# Patient Record
Sex: FEMALE | Race: WHITE | URBAN - METROPOLITAN AREA
[De-identification: names, ages, dates, MRNs, and addresses within clinical notes are randomized per-mention and may not be internally consistent; named-entity substitution may affect disease eponyms.]

---

## 2017-05-02 ENCOUNTER — OUTPATIENT (OUTPATIENT)
Dept: OUTPATIENT SERVICES | Facility: HOSPITAL | Age: 72
LOS: 1 days | Discharge: HOME | End: 2017-05-02

## 2017-06-28 DIAGNOSIS — R10.9 UNSPECIFIED ABDOMINAL PAIN: ICD-10-CM

## 2017-12-14 ENCOUNTER — OUTPATIENT (OUTPATIENT)
Dept: OUTPATIENT SERVICES | Facility: HOSPITAL | Age: 72
LOS: 1 days | Discharge: HOME | End: 2017-12-14

## 2017-12-14 DIAGNOSIS — R10.13 EPIGASTRIC PAIN: ICD-10-CM

## 2017-12-14 DIAGNOSIS — Z12.31 ENCOUNTER FOR SCREENING MAMMOGRAM FOR MALIGNANT NEOPLASM OF BREAST: ICD-10-CM

## 2017-12-14 DIAGNOSIS — M25.562 PAIN IN LEFT KNEE: ICD-10-CM

## 2017-12-14 DIAGNOSIS — K92.2 GASTROINTESTINAL HEMORRHAGE, UNSPECIFIED: ICD-10-CM

## 2018-01-20 ENCOUNTER — OUTPATIENT (OUTPATIENT)
Dept: OUTPATIENT SERVICES | Facility: HOSPITAL | Age: 73
LOS: 1 days | Discharge: HOME | End: 2018-01-20

## 2018-01-20 DIAGNOSIS — R10.9 UNSPECIFIED ABDOMINAL PAIN: ICD-10-CM

## 2018-02-04 DIAGNOSIS — R10.13 EPIGASTRIC PAIN: ICD-10-CM

## 2018-02-04 DIAGNOSIS — K92.2 GASTROINTESTINAL HEMORRHAGE, UNSPECIFIED: ICD-10-CM

## 2018-02-05 ENCOUNTER — OUTPATIENT (OUTPATIENT)
Dept: OUTPATIENT SERVICES | Facility: HOSPITAL | Age: 73
LOS: 1 days | Discharge: HOME | End: 2018-02-05

## 2018-02-05 DIAGNOSIS — M54.9 DORSALGIA, UNSPECIFIED: ICD-10-CM

## 2018-02-22 ENCOUNTER — OUTPATIENT (OUTPATIENT)
Dept: OUTPATIENT SERVICES | Facility: HOSPITAL | Age: 73
LOS: 1 days | Discharge: HOME | End: 2018-02-22

## 2018-02-22 DIAGNOSIS — M54.2 CERVICALGIA: ICD-10-CM

## 2018-02-22 DIAGNOSIS — R06.02 SHORTNESS OF BREATH: ICD-10-CM

## 2018-02-22 DIAGNOSIS — R05 COUGH: ICD-10-CM

## 2018-12-06 ENCOUNTER — EMERGENCY (EMERGENCY)
Facility: HOSPITAL | Age: 73
LOS: 0 days | Discharge: HOME | End: 2018-12-07
Attending: EMERGENCY MEDICINE | Admitting: EMERGENCY MEDICINE

## 2018-12-06 VITALS
RESPIRATION RATE: 20 BRPM | TEMPERATURE: 98 F | OXYGEN SATURATION: 100 % | DIASTOLIC BLOOD PRESSURE: 76 MMHG | HEART RATE: 71 BPM | SYSTOLIC BLOOD PRESSURE: 161 MMHG

## 2018-12-06 DIAGNOSIS — R55 SYNCOPE AND COLLAPSE: ICD-10-CM

## 2018-12-06 DIAGNOSIS — I10 ESSENTIAL (PRIMARY) HYPERTENSION: ICD-10-CM

## 2018-12-06 DIAGNOSIS — I44.7 LEFT BUNDLE-BRANCH BLOCK, UNSPECIFIED: ICD-10-CM

## 2018-12-06 DIAGNOSIS — R07.9 CHEST PAIN, UNSPECIFIED: ICD-10-CM

## 2018-12-06 LAB
ALBUMIN SERPL ELPH-MCNC: 4.4 G/DL — SIGNIFICANT CHANGE UP (ref 3.5–5.2)
ALP SERPL-CCNC: 52 U/L — SIGNIFICANT CHANGE UP (ref 30–115)
ALT FLD-CCNC: 16 U/L — SIGNIFICANT CHANGE UP (ref 0–41)
ANION GAP SERPL CALC-SCNC: 15 MMOL/L — HIGH (ref 7–14)
APPEARANCE UR: CLEAR — SIGNIFICANT CHANGE UP
AST SERPL-CCNC: 24 U/L — SIGNIFICANT CHANGE UP (ref 0–41)
BILIRUB SERPL-MCNC: 0.6 MG/DL — SIGNIFICANT CHANGE UP (ref 0.2–1.2)
BILIRUB UR-MCNC: NEGATIVE — SIGNIFICANT CHANGE UP
BUN SERPL-MCNC: 16 MG/DL — SIGNIFICANT CHANGE UP (ref 10–20)
CALCIUM SERPL-MCNC: 10.2 MG/DL — HIGH (ref 8.5–10.1)
CHLORIDE SERPL-SCNC: 98 MMOL/L — SIGNIFICANT CHANGE UP (ref 98–110)
CO2 SERPL-SCNC: 27 MMOL/L — SIGNIFICANT CHANGE UP (ref 17–32)
COLOR SPEC: YELLOW — SIGNIFICANT CHANGE UP
CREAT SERPL-MCNC: 0.8 MG/DL — SIGNIFICANT CHANGE UP (ref 0.7–1.5)
DIFF PNL FLD: NEGATIVE — SIGNIFICANT CHANGE UP
GLUCOSE SERPL-MCNC: 102 MG/DL — HIGH (ref 70–99)
GLUCOSE UR QL: NEGATIVE MG/DL — SIGNIFICANT CHANGE UP
HCT VFR BLD CALC: 41.6 % — SIGNIFICANT CHANGE UP (ref 37–47)
HGB BLD-MCNC: 13.4 G/DL — SIGNIFICANT CHANGE UP (ref 12–16)
KETONES UR-MCNC: NEGATIVE — SIGNIFICANT CHANGE UP
LEUKOCYTE ESTERASE UR-ACNC: NEGATIVE — SIGNIFICANT CHANGE UP
LIDOCAIN IGE QN: 57 U/L — SIGNIFICANT CHANGE UP (ref 7–60)
MCHC RBC-ENTMCNC: 26.5 PG — LOW (ref 27–31)
MCHC RBC-ENTMCNC: 32.2 G/DL — SIGNIFICANT CHANGE UP (ref 32–37)
MCV RBC AUTO: 82.2 FL — SIGNIFICANT CHANGE UP (ref 81–99)
NITRITE UR-MCNC: NEGATIVE — SIGNIFICANT CHANGE UP
NRBC # BLD: 0 /100 WBCS — SIGNIFICANT CHANGE UP (ref 0–0)
PH UR: 7 — SIGNIFICANT CHANGE UP (ref 5–8)
PLATELET # BLD AUTO: 181 K/UL — SIGNIFICANT CHANGE UP (ref 130–400)
POTASSIUM SERPL-MCNC: 4.8 MMOL/L — SIGNIFICANT CHANGE UP (ref 3.5–5)
POTASSIUM SERPL-SCNC: 4.8 MMOL/L — SIGNIFICANT CHANGE UP (ref 3.5–5)
PROT SERPL-MCNC: 7.3 G/DL — SIGNIFICANT CHANGE UP (ref 6–8)
PROT UR-MCNC: NEGATIVE MG/DL — SIGNIFICANT CHANGE UP
RBC # BLD: 5.06 M/UL — SIGNIFICANT CHANGE UP (ref 4.2–5.4)
RBC # FLD: 13.7 % — SIGNIFICANT CHANGE UP (ref 11.5–14.5)
SODIUM SERPL-SCNC: 140 MMOL/L — SIGNIFICANT CHANGE UP (ref 135–146)
SP GR SPEC: 1.02 — SIGNIFICANT CHANGE UP (ref 1.01–1.03)
TROPONIN T SERPL-MCNC: <0.01 NG/ML — SIGNIFICANT CHANGE UP
TROPONIN T SERPL-MCNC: <0.01 NG/ML — SIGNIFICANT CHANGE UP
UROBILINOGEN FLD QL: 1 MG/DL (ref 0.2–0.2)
WBC # BLD: 7.46 K/UL — SIGNIFICANT CHANGE UP (ref 4.8–10.8)
WBC # FLD AUTO: 7.46 K/UL — SIGNIFICANT CHANGE UP (ref 4.8–10.8)

## 2018-12-06 RX ORDER — ONDANSETRON 8 MG/1
4 TABLET, FILM COATED ORAL ONCE
Qty: 0 | Refills: 0 | Status: COMPLETED | OUTPATIENT
Start: 2018-12-06 | End: 2018-12-06

## 2018-12-06 RX ORDER — ASPIRIN/CALCIUM CARB/MAGNESIUM 324 MG
325 TABLET ORAL ONCE
Qty: 0 | Refills: 0 | Status: COMPLETED | OUTPATIENT
Start: 2018-12-06 | End: 2018-12-06

## 2018-12-06 RX ORDER — SODIUM CHLORIDE 9 MG/ML
1000 INJECTION INTRAMUSCULAR; INTRAVENOUS; SUBCUTANEOUS ONCE
Qty: 0 | Refills: 0 | Status: COMPLETED | OUTPATIENT
Start: 2018-12-06 | End: 2018-12-06

## 2018-12-06 RX ORDER — ESCITALOPRAM OXALATE 10 MG/1
10 TABLET, FILM COATED ORAL AT BEDTIME
Qty: 0 | Refills: 0 | Status: DISCONTINUED | OUTPATIENT
Start: 2018-12-06 | End: 2018-12-07

## 2018-12-06 RX ORDER — CARBAMAZEPINE 200 MG
600 TABLET ORAL AT BEDTIME
Qty: 0 | Refills: 0 | Status: DISCONTINUED | OUTPATIENT
Start: 2018-12-06 | End: 2018-12-06

## 2018-12-06 RX ORDER — CARBAMAZEPINE 200 MG
800 TABLET ORAL
Qty: 0 | Refills: 0 | Status: DISCONTINUED | OUTPATIENT
Start: 2018-12-06 | End: 2018-12-06

## 2018-12-06 RX ORDER — DOCUSATE SODIUM 100 MG
100 CAPSULE ORAL AT BEDTIME
Qty: 0 | Refills: 0 | Status: DISCONTINUED | OUTPATIENT
Start: 2018-12-06 | End: 2018-12-07

## 2018-12-06 RX ADMIN — ONDANSETRON 4 MILLIGRAM(S): 8 TABLET, FILM COATED ORAL at 15:12

## 2018-12-06 RX ADMIN — SODIUM CHLORIDE 500 MILLILITER(S): 9 INJECTION INTRAMUSCULAR; INTRAVENOUS; SUBCUTANEOUS at 15:12

## 2018-12-06 RX ADMIN — SODIUM CHLORIDE 1000 MILLILITER(S): 9 INJECTION INTRAMUSCULAR; INTRAVENOUS; SUBCUTANEOUS at 15:42

## 2018-12-06 RX ADMIN — Medication 325 MILLIGRAM(S): at 16:28

## 2018-12-06 RX ADMIN — Medication 100 MILLIGRAM(S): at 21:22

## 2018-12-06 RX ADMIN — ESCITALOPRAM OXALATE 10 MILLIGRAM(S): 10 TABLET, FILM COATED ORAL at 21:22

## 2018-12-06 NOTE — ED ADULT NURSE REASSESSMENT NOTE - CONDITION
Patient ambulating with steady gait. No complaints of chest pain, shortness of breath or dizziness. Will continue to monitor. Continuous cardiac monitoring in progress. PM medication provided. Will continue to monitor. Patient is alert and oriented and able to make needs known./improved

## 2018-12-06 NOTE — ED PROVIDER NOTE - OBJECTIVE STATEMENT
74 yo F with hx of HTN, with known LBBB, presents for evaluation of near syncope, onset today, associated with flushing and chest discomfort. No fever, no chills, no headache, no nausea, no vomiting, no back pain, no SOB. Patient states she was at the doctors office when she started having chest discomfort and an episode where she felt "faint" but never fainted. Patient was sent to the ED for further evaluation. MARY Amador

## 2018-12-06 NOTE — ED PROVIDER NOTE - PROGRESS NOTE DETAILS
Patient still continues to feel the discomfort Discussed with patient labs and imaging. Discussed OBS. Patient agrees with plan

## 2018-12-06 NOTE — ED PROVIDER NOTE - MEDICAL DECISION MAKING DETAILS
pt with chest pressure and lightheadedness with near syncope.  NO SYNCOPE.  no abd pain, no back pain.  NO SOB.  no current chest pain or sob in the ED.  Trop negative.  pt given aspirin.  pt placed on observation for further cardiac work up.

## 2018-12-06 NOTE — ED PROVIDER NOTE - ATTENDING CONTRIBUTION TO CARE
74 yo f with pmh of htn presents with chest pressure associated with light headedness.  NO syncope.  no nauysea, no vomiting, no headache, no back pain, no abd pain, no urinary complaints, no dysuria.  no fevers, no chills.  awake, alert.  neck supple  abd soft, nontender.  LUngs clear.  MMM.  motor/sensation intact in all 4 ext.    a/p:  chest pain and lightheadedness.    p:  aspirin, ekg, cxr, supportive care, likely place on observation

## 2018-12-06 NOTE — ED CDU PROVIDER INITIAL DAY NOTE - OBJECTIVE STATEMENT
pt is a 72yo F w pmhx of IBS, HTN and LBBB comes to ed c/o pre- syncopal episode when she went to her GI doctor today. pt states she had a weird bleching sensation and didn feel well.  She went to walk to the  to tell he  and patient got shaky, lost control of her legs and she fell down. pt denies hitting her head or any injuries fell straight and was sitting on the floor. She thinks that maybe it could of been an anxiety attack. pT denies any visual changes, spinning of the room sensation,CP, SOB, N/V/D, H/A.

## 2018-12-06 NOTE — ED ADULT NURSE NOTE - CHPI ED NUR SYMPTOMS NEG
no tingling/no weakness/no fever/no chills/no pain/no vomiting/no nausea/no decreased eating/drinking

## 2018-12-06 NOTE — ED CDU PROVIDER INITIAL DAY NOTE - PROGRESS NOTE DETAILS
s/o note:  received s/o from RITA Chapman evaluated patient at bedside. no complaints. night meds ordered. will continue to observe. echo in am

## 2018-12-06 NOTE — ED ADULT NURSE NOTE - OBJECTIVE STATEMENT
Patient is a 73 year old complaining of dizziness and near syncope while at the GYN office today. Patient denies having a syncopal episode but states she felt like she was about to pass out. Patient denies any chest pain, shortness of breath, palpitations or abdominal pain

## 2018-12-06 NOTE — ED ADULT NURSE NOTE - NSIMPLEMENTINTERV_GEN_ALL_ED
Implemented All Universal Safety Interventions:  [place] to call system. Call bell, personal items and telephone within reach. Instruct patient to call for assistance. Room bathroom lighting operational. Non-slip footwear when patient is off stretcher. Physically safe environment: no spills, clutter or unnecessary equipment. Stretcher in lowest position, wheels locked, appropriate side rails in place.

## 2018-12-06 NOTE — ED CDU PROVIDER INITIAL DAY NOTE - ATTENDING CONTRIBUTION TO CARE
74 yo F with hx of HTN, with known LBBB, presents for evaluation of near syncope. Patient had mild chest discomfort during the event. Discussion had with cardiologist / Gustabo Adams who is very familiar with the patient. Patient has a history of GI bleed and Dr. Adams requested a repeat CBC, also requested a 2 d echo. Stated that he reviewed her CCTA from this year and if the echo is normal patient can go home. Patient does not want to wait for final results of echo. The patient wishes to leave against medical advice.  I have discussed the risks, benefits and alternatives (including the possibility of worsening of disease, pain, permanent disability, and/or death) with the patient and his/her family (if available).  The patient voices understanding of these risks, benefits, and alternatives and still wishes to sign out against medical advice.  The patient is awake, alert, oriented x 3 and has demonstrated capacity to refuse/direct care.  I have advised the patient that they can and should return immediately should they develop any worse/different/additional symptoms, or if they change their mind and want to continue their care. Patient has full capacity and has verbalized understanding.  Given detailed returned precautions.     CONSTITUTIONAL: Well-developed; well-nourished; in no acute distress. Sitting up and providing appropriate history and physical examination  SKIN: skin exam is warm and dry, no acute rash.  HEAD: Normocephalic; atraumatic.  EYES: PERRL, 3 mm bilateral, no nystagmus, EOM intact; conjunctiva and sclera clear.  ENT: No nasal discharge; airway clear.  NECK: Supple; non tender. + full passive ROM in all directions. No JVD  CARD: S1, S2 normal; no murmurs, gallops, or rubs. Regular rate and rhythm. + Symmetric Strong Pulses  RESP: No wheezes, rales or rhonchi. Good air movement bilaterally  ABD: soft; non-distended; non-tender. No Rebound, No Guarding, No signs of peritonitis, No CVA tenderness. No pulsatile abdominal mass. + Strong and Symmetric Pulses  EXT: Normal ROM. No clubbing, cyanosis or edema. Dp and Pt Pulses intact. Cap refill less than 3 seconds  NEURO: CN 2-12 intact, normal finger to nose, normal romberg, stable gait, no sensory or motor deficits, Alert, oriented, grossly unremarkable. No Focal deficits. GCS 15. NIH 0  PSYCH: Cooperative, appropriate.

## 2018-12-07 VITALS — DIASTOLIC BLOOD PRESSURE: 92 MMHG | HEART RATE: 91 BPM | SYSTOLIC BLOOD PRESSURE: 166 MMHG

## 2018-12-07 LAB
HCT VFR BLD CALC: 41.1 % — SIGNIFICANT CHANGE UP (ref 37–47)
HGB BLD-MCNC: 12.9 G/DL — SIGNIFICANT CHANGE UP (ref 12–16)
MCHC RBC-ENTMCNC: 25.7 PG — LOW (ref 27–31)
MCHC RBC-ENTMCNC: 31.4 G/DL — LOW (ref 32–37)
MCV RBC AUTO: 82 FL — SIGNIFICANT CHANGE UP (ref 81–99)
NRBC # BLD: 0 /100 WBCS — SIGNIFICANT CHANGE UP (ref 0–0)
PLATELET # BLD AUTO: 178 K/UL — SIGNIFICANT CHANGE UP (ref 130–400)
RBC # BLD: 5.01 M/UL — SIGNIFICANT CHANGE UP (ref 4.2–5.4)
RBC # FLD: 13.8 % — SIGNIFICANT CHANGE UP (ref 11.5–14.5)
WBC # BLD: 5.09 K/UL — SIGNIFICANT CHANGE UP (ref 4.8–10.8)
WBC # FLD AUTO: 5.09 K/UL — SIGNIFICANT CHANGE UP (ref 4.8–10.8)

## 2018-12-07 RX ORDER — DILTIAZEM HCL 120 MG
180 CAPSULE, EXT RELEASE 24 HR ORAL DAILY
Qty: 0 | Refills: 0 | Status: DISCONTINUED | OUTPATIENT
Start: 2018-12-07 | End: 2018-12-07

## 2018-12-07 RX ORDER — HYDROCHLOROTHIAZIDE 25 MG
12.5 TABLET ORAL ONCE
Qty: 0 | Refills: 0 | Status: COMPLETED | OUTPATIENT
Start: 2018-12-07 | End: 2018-12-07

## 2018-12-07 RX ORDER — LOSARTAN POTASSIUM 100 MG/1
100 TABLET, FILM COATED ORAL DAILY
Qty: 0 | Refills: 0 | Status: DISCONTINUED | OUTPATIENT
Start: 2018-12-07 | End: 2018-12-07

## 2018-12-07 RX ADMIN — Medication 12.5 MILLIGRAM(S): at 15:23

## 2018-12-07 RX ADMIN — Medication 180 MILLIGRAM(S): at 15:24

## 2018-12-07 RX ADMIN — LOSARTAN POTASSIUM 100 MILLIGRAM(S): 100 TABLET, FILM COATED ORAL at 15:24

## 2018-12-07 NOTE — ED ADULT NURSE REASSESSMENT NOTE - NS ED NURSE REASSESS COMMENT FT1
Received patient under my care patient is alert and oriented X 4 patient denies CP or SOB no C/o of dizziness patient on continous cardiac monitor.  Awaiting echo and disposition.
patient assessed, pt complained of dizziness and nausea, Zofran given for nausea with IVF as ordered, labs sent pending results, cardiac monitoring maintained, vital signs stable, will continue to monitor,.
Patient leaving AMA no cardiolologist to read ECHO. Patient agrees with plan to be called tomorrow with results

## 2018-12-07 NOTE — ED CDU PROVIDER SUBSEQUENT DAY NOTE - ATTENDING CONTRIBUTION TO CARE
72 yo F with hx of HTN, with known LBBB, presents for evaluation of near syncope. Patient had mild chest discomfort during the event. Discussion had with cardiologist / Gustabo Adams who is very familiar with the patient. Patient has a history of GI bleed and Dr. Adams requested a repeat CBC, also requested a 2 d echo. Stated that he reviewed her CCTA from this year and if the echo is normal patient can go home. Patient does not want to wait for final results of echo. The patient wishes to leave against medical advice.  I have discussed the risks, benefits and alternatives (including the possibility of worsening of disease, pain, permanent disability, and/or death) with the patient and his/her family (if available).  The patient voices understanding of these risks, benefits, and alternatives and still wishes to sign out against medical advice.  The patient is awake, alert, oriented x 3 and has demonstrated capacity to refuse/direct care.  I have advised the patient that they can and should return immediately should they develop any worse/different/additional symptoms, or if they change their mind and want to continue their care. Patient has full capacity and has verbalized understanding.  Given detailed returned precautions.     CONSTITUTIONAL: Well-developed; well-nourished; in no acute distress. Sitting up and providing appropriate history and physical examination  SKIN: skin exam is warm and dry, no acute rash.  HEAD: Normocephalic; atraumatic.  EYES: PERRL, 3 mm bilateral, no nystagmus, EOM intact; conjunctiva and sclera clear.  ENT: No nasal discharge; airway clear.  NECK: Supple; non tender. + full passive ROM in all directions. No JVD  CARD: S1, S2 normal; no murmurs, gallops, or rubs. Regular rate and rhythm. + Symmetric Strong Pulses  RESP: No wheezes, rales or rhonchi. Good air movement bilaterally  ABD: soft; non-distended; non-tender. No Rebound, No Guarding, No signs of peritonitis, No CVA tenderness. No pulsatile abdominal mass. + Strong and Symmetric Pulses  EXT: Normal ROM. No clubbing, cyanosis or edema. Dp and Pt Pulses intact. Cap refill less than 3 seconds  NEURO: CN 2-12 intact, normal finger to nose, normal romberg, stable gait, no sensory or motor deficits, Alert, oriented, grossly unremarkable. No Focal deficits. GCS 15. NIH 0  PSYCH: Cooperative, appropriate.

## 2018-12-07 NOTE — ED CDU PROVIDER SUBSEQUENT DAY NOTE - HISTORY
t is a 72yo F w pmhx of IBS, HTN and LBBB comes to ed c/o pre- syncopal episode when she went to her GI doctor yesterday. Pt seen bedside NAD, going for echo.

## 2018-12-07 NOTE — ED CDU PROVIDER DISPOSITION NOTE - CLINICAL COURSE
72 yo F with hx of HTN, with known LBBB, presents for evaluation of near syncope. Patient had mild chest discomfort during the event. Discussion had with cardiologist / Gustabo Adams who is very familiar with the patient. Patient has a history of GI bleed and Dr. Adams requested a repeat CBC, also requested a 2 d echo. Stated that he reviewed her CCTA from this year and if the echo is normal patient can go home. Patient does not want to wait for final results of echo. The patient wishes to leave against medical advice.  I have discussed the risks, benefits and alternatives (including the possibility of worsening of disease, pain, permanent disability, and/or death) with the patient and his/her family (if available).  The patient voices understanding of these risks, benefits, and alternatives and still wishes to sign out against medical advice.  The patient is awake, alert, oriented x 3 and has demonstrated capacity to refuse/direct care.  I have advised the patient that they can and should return immediately should they develop any worse/different/additional symptoms, or if they change their mind and want to continue their care. Patient has full capacity and has verbalized understanding.  Given detailed returned precautions.

## 2018-12-07 NOTE — ED CDU PROVIDER SUBSEQUENT DAY NOTE - PROGRESS NOTE DETAILS
Dr. Morgan spoke with Dr. Ma pts cadiologist, plan is to repeat CBC and get echo results if normal; pt can be discharged and follow up outpatient with Dr. Reveles.

## 2018-12-15 ENCOUNTER — OUTPATIENT (OUTPATIENT)
Dept: OUTPATIENT SERVICES | Facility: HOSPITAL | Age: 73
LOS: 1 days | Discharge: HOME | End: 2018-12-15

## 2018-12-15 DIAGNOSIS — Z12.31 ENCOUNTER FOR SCREENING MAMMOGRAM FOR MALIGNANT NEOPLASM OF BREAST: ICD-10-CM

## 2018-12-15 PROBLEM — F41.9 ANXIETY DISORDER, UNSPECIFIED: Chronic | Status: ACTIVE | Noted: 2018-12-06

## 2018-12-15 PROBLEM — I10 ESSENTIAL (PRIMARY) HYPERTENSION: Chronic | Status: ACTIVE | Noted: 2018-12-06

## 2018-12-15 PROBLEM — K21.9 GASTRO-ESOPHAGEAL REFLUX DISEASE WITHOUT ESOPHAGITIS: Chronic | Status: ACTIVE | Noted: 2018-12-06

## 2019-12-17 ENCOUNTER — OUTPATIENT (OUTPATIENT)
Dept: OUTPATIENT SERVICES | Facility: HOSPITAL | Age: 74
LOS: 1 days | Discharge: HOME | End: 2019-12-17
Payer: MEDICARE

## 2019-12-17 DIAGNOSIS — Z12.31 ENCOUNTER FOR SCREENING MAMMOGRAM FOR MALIGNANT NEOPLASM OF BREAST: ICD-10-CM

## 2019-12-17 PROCEDURE — 77063 BREAST TOMOSYNTHESIS BI: CPT | Mod: 26

## 2019-12-17 PROCEDURE — 77067 SCR MAMMO BI INCL CAD: CPT | Mod: 26

## 2019-12-19 DIAGNOSIS — M81.0 AGE-RELATED OSTEOPOROSIS WITHOUT CURRENT PATHOLOGICAL FRACTURE: ICD-10-CM

## 2019-12-19 DIAGNOSIS — Z78.0 ASYMPTOMATIC MENOPAUSAL STATE: ICD-10-CM

## 2019-12-19 DIAGNOSIS — Z09 ENCOUNTER FOR FOLLOW-UP EXAMINATION AFTER COMPLETED TREATMENT FOR CONDITIONS OTHER THAN MALIGNANT NEOPLASM: ICD-10-CM

## 2020-12-18 ENCOUNTER — OUTPATIENT (OUTPATIENT)
Dept: OUTPATIENT SERVICES | Facility: HOSPITAL | Age: 75
LOS: 1 days | Discharge: HOME | End: 2020-12-18
Payer: MEDICARE

## 2020-12-18 DIAGNOSIS — Z12.31 ENCOUNTER FOR SCREENING MAMMOGRAM FOR MALIGNANT NEOPLASM OF BREAST: ICD-10-CM

## 2020-12-18 PROCEDURE — 77067 SCR MAMMO BI INCL CAD: CPT | Mod: 26

## 2020-12-18 PROCEDURE — 77063 BREAST TOMOSYNTHESIS BI: CPT | Mod: 26

## 2021-02-07 ENCOUNTER — OUTPATIENT (OUTPATIENT)
Dept: OUTPATIENT SERVICES | Facility: HOSPITAL | Age: 76
LOS: 1 days | Discharge: HOME | End: 2021-02-07

## 2021-02-07 DIAGNOSIS — Z11.59 ENCOUNTER FOR SCREENING FOR OTHER VIRAL DISEASES: ICD-10-CM

## 2021-02-10 ENCOUNTER — OUTPATIENT (OUTPATIENT)
Dept: OUTPATIENT SERVICES | Facility: HOSPITAL | Age: 76
LOS: 1 days | Discharge: HOME | End: 2021-02-10

## 2021-02-10 VITALS
RESPIRATION RATE: 16 BRPM | HEIGHT: 62 IN | DIASTOLIC BLOOD PRESSURE: 96 MMHG | WEIGHT: 160.06 LBS | HEART RATE: 112 BPM | OXYGEN SATURATION: 99 % | TEMPERATURE: 99 F | SYSTOLIC BLOOD PRESSURE: 147 MMHG

## 2021-02-10 VITALS — RESPIRATION RATE: 17 BRPM | DIASTOLIC BLOOD PRESSURE: 73 MMHG | SYSTOLIC BLOOD PRESSURE: 145 MMHG | HEART RATE: 87 BPM

## 2021-02-10 DIAGNOSIS — Z98.891 HISTORY OF UTERINE SCAR FROM PREVIOUS SURGERY: Chronic | ICD-10-CM

## 2021-02-10 DIAGNOSIS — Z98.890 OTHER SPECIFIED POSTPROCEDURAL STATES: Chronic | ICD-10-CM

## 2021-02-10 NOTE — CHART NOTE - NSCHARTNOTEFT_GEN_A_CORE
PACU ANESTHESIA ADMISSION NOTE      Procedure: Cataract extraction with IOL implant OS   Post op DX  Cataract left eye  ____  Intubated  TV:______       Rate: ______      FiO2: ______    __x__  Patent Airway    _x___  Full return of protective reflexes    __x__  Full recovery from anesthesia / back to baseline status    Vitals:  T(C): 37.2 (02-10-21 @ 12:30), Max: 37.2 (02-10-21 @ 11:20)  HR: 112 (02-10-21 @ 12:30) (112 - 112)  BP: 147/96 (02-10-21 @ 12:30) (147/96 - 147/96)  RR: 16 (02-10-21 @ 12:30) (16 - 16)  SpO2: 99% (02-10-21 @ 12:30) (99% - 99%)    Mental Status:  __x__ Awake   _____ Alert   _____ Drowsy   _____ Sedated    Nausea/Vomiting:  ____ NO  __x___Yes,   See Post - Op Orders          Pain Scale (0-10):  _____    Treatment: ____ None    _x  Post - Operative Fluids:   ____ Oral   _x___ See Post - Op Orders    Plan: Discharge:   __x__Home       _____Floor     _____Critical Care    _____  Other:_________________    Comments: uneventful anesthesia course no complications. VItals stable. Pt transferred to PACU

## 2021-02-16 DIAGNOSIS — I10 ESSENTIAL (PRIMARY) HYPERTENSION: ICD-10-CM

## 2021-02-16 DIAGNOSIS — H25.12 AGE-RELATED NUCLEAR CATARACT, LEFT EYE: ICD-10-CM

## 2021-02-16 DIAGNOSIS — F32.9 MAJOR DEPRESSIVE DISORDER, SINGLE EPISODE, UNSPECIFIED: ICD-10-CM

## 2021-02-16 DIAGNOSIS — K21.9 GASTRO-ESOPHAGEAL REFLUX DISEASE WITHOUT ESOPHAGITIS: ICD-10-CM

## 2021-02-16 DIAGNOSIS — Z88.0 ALLERGY STATUS TO PENICILLIN: ICD-10-CM

## 2021-02-16 DIAGNOSIS — K58.9 IRRITABLE BOWEL SYNDROME WITHOUT DIARRHEA: ICD-10-CM

## 2021-02-16 DIAGNOSIS — H57.04 MYDRIASIS: ICD-10-CM

## 2021-02-16 DIAGNOSIS — K64.9 UNSPECIFIED HEMORRHOIDS: ICD-10-CM

## 2021-02-16 DIAGNOSIS — J30.2 OTHER SEASONAL ALLERGIC RHINITIS: ICD-10-CM

## 2021-03-30 ENCOUNTER — TRANSCRIPTION ENCOUNTER (OUTPATIENT)
Age: 76
End: 2021-03-30

## 2021-05-28 ENCOUNTER — TRANSCRIPTION ENCOUNTER (OUTPATIENT)
Age: 76
End: 2021-05-28

## 2021-12-22 ENCOUNTER — OUTPATIENT (OUTPATIENT)
Dept: OUTPATIENT SERVICES | Facility: HOSPITAL | Age: 76
LOS: 1 days | Discharge: HOME | End: 2021-12-22
Payer: MEDICARE

## 2021-12-22 DIAGNOSIS — Z98.890 OTHER SPECIFIED POSTPROCEDURAL STATES: Chronic | ICD-10-CM

## 2021-12-22 DIAGNOSIS — Z12.31 ENCOUNTER FOR SCREENING MAMMOGRAM FOR MALIGNANT NEOPLASM OF BREAST: ICD-10-CM

## 2021-12-22 DIAGNOSIS — Z98.891 HISTORY OF UTERINE SCAR FROM PREVIOUS SURGERY: Chronic | ICD-10-CM

## 2021-12-22 PROBLEM — K64.9 UNSPECIFIED HEMORRHOIDS: Chronic | Status: ACTIVE | Noted: 2021-02-10

## 2021-12-22 PROBLEM — K58.9 IRRITABLE BOWEL SYNDROME WITHOUT DIARRHEA: Chronic | Status: ACTIVE | Noted: 2021-02-10

## 2021-12-22 PROBLEM — J30.2 OTHER SEASONAL ALLERGIC RHINITIS: Chronic | Status: ACTIVE | Noted: 2021-02-10

## 2021-12-22 PROBLEM — K58.9 IRRITABLE BOWEL SYNDROME, UNSPECIFIED: Chronic | Status: ACTIVE | Noted: 2021-02-10

## 2021-12-22 PROCEDURE — 77063 BREAST TOMOSYNTHESIS BI: CPT | Mod: 26

## 2021-12-22 PROCEDURE — 77067 SCR MAMMO BI INCL CAD: CPT | Mod: 26

## 2021-12-23 DIAGNOSIS — Z09 ENCOUNTER FOR FOLLOW-UP EXAMINATION AFTER COMPLETED TREATMENT FOR CONDITIONS OTHER THAN MALIGNANT NEOPLASM: ICD-10-CM

## 2021-12-23 DIAGNOSIS — M81.0 AGE-RELATED OSTEOPOROSIS WITHOUT CURRENT PATHOLOGICAL FRACTURE: ICD-10-CM

## 2021-12-23 DIAGNOSIS — Z82.62 FAMILY HISTORY OF OSTEOPOROSIS: ICD-10-CM

## 2021-12-23 DIAGNOSIS — Z78.0 ASYMPTOMATIC MENOPAUSAL STATE: ICD-10-CM

## 2022-02-24 ENCOUNTER — OUTPATIENT (OUTPATIENT)
Dept: OUTPATIENT SERVICES | Facility: HOSPITAL | Age: 77
LOS: 1 days | Discharge: HOME | End: 2022-02-24
Payer: MEDICARE

## 2022-02-24 DIAGNOSIS — Z98.891 HISTORY OF UTERINE SCAR FROM PREVIOUS SURGERY: Chronic | ICD-10-CM

## 2022-02-24 DIAGNOSIS — J20.9 ACUTE BRONCHITIS, UNSPECIFIED: ICD-10-CM

## 2022-02-24 DIAGNOSIS — Z98.890 OTHER SPECIFIED POSTPROCEDURAL STATES: Chronic | ICD-10-CM

## 2022-02-24 PROCEDURE — 71046 X-RAY EXAM CHEST 2 VIEWS: CPT | Mod: 26

## 2022-03-18 ENCOUNTER — OUTPATIENT (OUTPATIENT)
Dept: OUTPATIENT SERVICES | Facility: HOSPITAL | Age: 77
LOS: 1 days | Discharge: HOME | End: 2022-03-18
Payer: MEDICARE

## 2022-03-18 DIAGNOSIS — I31.3 PERICARDIAL EFFUSION (NONINFLAMMATORY): ICD-10-CM

## 2022-03-18 DIAGNOSIS — Z98.890 OTHER SPECIFIED POSTPROCEDURAL STATES: Chronic | ICD-10-CM

## 2022-03-18 DIAGNOSIS — Z98.891 HISTORY OF UTERINE SCAR FROM PREVIOUS SURGERY: Chronic | ICD-10-CM

## 2022-03-18 PROCEDURE — 71250 CT THORAX DX C-: CPT | Mod: 26,MH

## 2022-03-18 PROCEDURE — 72110 X-RAY EXAM L-2 SPINE 4/>VWS: CPT | Mod: 26

## 2022-03-23 PROBLEM — Z00.00 ENCOUNTER FOR PREVENTIVE HEALTH EXAMINATION: Status: ACTIVE | Noted: 2022-03-23

## 2022-03-29 ENCOUNTER — OUTPATIENT (OUTPATIENT)
Dept: OUTPATIENT SERVICES | Facility: HOSPITAL | Age: 77
LOS: 1 days | Discharge: HOME | End: 2022-03-29
Payer: MEDICARE

## 2022-03-29 DIAGNOSIS — Z98.891 HISTORY OF UTERINE SCAR FROM PREVIOUS SURGERY: Chronic | ICD-10-CM

## 2022-03-29 DIAGNOSIS — M54.9 DORSALGIA, UNSPECIFIED: ICD-10-CM

## 2022-03-29 DIAGNOSIS — Z98.890 OTHER SPECIFIED POSTPROCEDURAL STATES: Chronic | ICD-10-CM

## 2022-03-29 PROCEDURE — 72148 MRI LUMBAR SPINE W/O DYE: CPT | Mod: 26,MH

## 2022-04-12 ENCOUNTER — APPOINTMENT (OUTPATIENT)
Dept: NEUROSURGERY | Facility: CLINIC | Age: 77
End: 2022-04-12
Payer: MEDICARE

## 2022-04-12 VITALS — BODY MASS INDEX: 28.52 KG/M2 | HEIGHT: 62 IN | WEIGHT: 155 LBS

## 2022-04-12 DIAGNOSIS — Z87.09 PERSONAL HISTORY OF OTHER DISEASES OF THE RESPIRATORY SYSTEM: ICD-10-CM

## 2022-04-12 DIAGNOSIS — Z87.19 PERSONAL HISTORY OF OTHER DISEASES OF THE DIGESTIVE SYSTEM: ICD-10-CM

## 2022-04-12 DIAGNOSIS — Z80.9 FAMILY HISTORY OF MALIGNANT NEOPLASM, UNSPECIFIED: ICD-10-CM

## 2022-04-12 DIAGNOSIS — S32.010A WEDGE COMPRESSION FRACTURE OF FIRST LUMBAR VERTEBRA, INITIAL ENCOUNTER FOR CLOSED FRACTURE: ICD-10-CM

## 2022-04-12 DIAGNOSIS — Z82.49 FAMILY HISTORY OF ISCHEMIC HEART DISEASE AND OTHER DISEASES OF THE CIRCULATORY SYSTEM: ICD-10-CM

## 2022-04-12 DIAGNOSIS — Z86.79 PERSONAL HISTORY OF OTHER DISEASES OF THE CIRCULATORY SYSTEM: ICD-10-CM

## 2022-04-12 DIAGNOSIS — Z78.9 OTHER SPECIFIED HEALTH STATUS: ICD-10-CM

## 2022-04-12 DIAGNOSIS — Z86.59 PERSONAL HISTORY OF OTHER MENTAL AND BEHAVIORAL DISORDERS: ICD-10-CM

## 2022-04-12 DIAGNOSIS — Z87.39 PERSONAL HISTORY OF OTHER DISEASES OF THE MUSCULOSKELETAL SYSTEM AND CONNECTIVE TISSUE: ICD-10-CM

## 2022-04-12 PROCEDURE — 99204 OFFICE O/P NEW MOD 45 MIN: CPT

## 2022-04-12 RX ORDER — LOSARTAN POTASSIUM AND HYDROCHLOROTHIAZIDE 12.5; 1 MG/1; MG/1
100-12.5 TABLET ORAL
Refills: 0 | Status: ACTIVE | COMMUNITY

## 2022-04-12 RX ORDER — DILTIAZEM HYDROCHLORIDE 180 MG/1
180 CAPSULE, EXTENDED RELEASE ORAL
Refills: 0 | Status: ACTIVE | COMMUNITY

## 2022-04-12 RX ORDER — LORATADINE 10 MG/1
10 TABLET ORAL
Refills: 0 | Status: ACTIVE | COMMUNITY

## 2022-04-12 RX ORDER — MONTELUKAST SODIUM 10 MG/1
10 TABLET, FILM COATED ORAL
Refills: 0 | Status: ACTIVE | COMMUNITY

## 2022-04-12 RX ORDER — LORAZEPAM 0.5 MG/1
0.5 TABLET ORAL
Refills: 0 | Status: ACTIVE | COMMUNITY

## 2022-04-12 RX ORDER — PANTOPRAZOLE 40 MG/1
40 TABLET, DELAYED RELEASE ORAL
Refills: 0 | Status: ACTIVE | COMMUNITY

## 2022-04-13 NOTE — HISTORY OF PRESENT ILLNESS
[de-identified] : Ms. BALL had a fall in February 2022.  She tripped and fell.  After the fall she was feeling well without pain.  Unfortunately 3 weeks ago she was getting up from the couch when she developed acute back pain.  It was quite severe.  She saw her primary care physician.  X-rays were ordered followed by an MRI of the lumbar spine. I have reviewed the studies today.  She is found to have an acute/subacute L1 compression fracture.  Recent DEXA scan from December 2021 confirmed osteoporosis.  She is on Prolia.  No recent physical therapy.  Today she does report that her back pain has been improving.  Although she has continued pain she is much more comfortable.  She denies radiculopathy.\par \par PHYSICAL EXAM: \par \par Constitutional: Well appearing, no distress\par HEENT: Normocephalic Atraumatic\par Psychiatric: Alert and oriented to all spheres, normal mood\par Pulmonary: No respiratory distress\par \par Neurologic: \par CN II-XII grossly intact\par Palpation: Mild lumbar tenderness.\par Strength: Full strength in all major muscle groups, no atrophy\par Sensation: Full sensation to light touch in all extremities\par Reflexes: \par  2+ patellar\par  2+ biceps\par  2+ ankle jerk\par  No Stapleton's\par  No clonus\par  No babinski\par \par ROM: Mild restriction range of motion lumbar spine.\par \par Signs:\par SLR negative\par L'hermitte's negative\par \par Gait: steady, walking w/o assistance. \par \par \par \par

## 2022-04-13 NOTE — ASSESSMENT
[FreeTextEntry1] : Ms. BALL is is found to have an acute/subacute L1 compression fracture.  Her back pain has been improving.  I have recommended outpatient physical therapy.  If she continues to improve then she may follow-up as needed.

## 2022-04-13 NOTE — END OF VISIT
[FreeTextEntry3] : ATTENDING ADDENDUM\par Patient seen and examined. Clinical history, imaging, and electronic medical record reviewed independently by me. Assessment and plan per ACP note above.  Ms. Mora presents to evaluate her low back pain secondary to L1 osteoporotic compression fracture; she is improved with expectant management.  We will enroll her into physical therapy and continue to follow her and manage conservatively.  If she fails this she is a candidate for an L1 kyphoplasty procedure.  She expressed agreement and will follow up in several months.\par \par Neurologic Exam/Physical Exam\par Alert & Oriented x 3\par CN2-12 grossly intact\par Sensation intact to light touch, pain/temperature intact\par Vibration/proprioception Intact\par Motor strength 5/5 in all extremities\par 2+ patellar, 2+ brachioradialis \par  [Time Spent: ___ minutes] : I have spent [unfilled] minutes of time on the encounter.

## 2022-06-16 ENCOUNTER — OUTPATIENT (OUTPATIENT)
Dept: OUTPATIENT SERVICES | Facility: HOSPITAL | Age: 77
LOS: 1 days | Discharge: HOME | End: 2022-06-16
Payer: MEDICARE

## 2022-06-16 DIAGNOSIS — Z98.891 HISTORY OF UTERINE SCAR FROM PREVIOUS SURGERY: Chronic | ICD-10-CM

## 2022-06-16 DIAGNOSIS — Z98.890 OTHER SPECIFIED POSTPROCEDURAL STATES: Chronic | ICD-10-CM

## 2022-06-16 DIAGNOSIS — R10.9 UNSPECIFIED ABDOMINAL PAIN: ICD-10-CM

## 2022-06-16 PROCEDURE — 76700 US EXAM ABDOM COMPLETE: CPT | Mod: 26

## 2022-09-27 ENCOUNTER — OUTPATIENT (OUTPATIENT)
Dept: OUTPATIENT SERVICES | Facility: HOSPITAL | Age: 77
LOS: 1 days | Discharge: HOME | End: 2022-09-27

## 2022-09-27 DIAGNOSIS — R07.9 CHEST PAIN, UNSPECIFIED: ICD-10-CM

## 2022-09-27 DIAGNOSIS — Z98.890 OTHER SPECIFIED POSTPROCEDURAL STATES: Chronic | ICD-10-CM

## 2022-09-27 DIAGNOSIS — Z98.891 HISTORY OF UTERINE SCAR FROM PREVIOUS SURGERY: Chronic | ICD-10-CM

## 2022-09-27 PROCEDURE — 75574 CT ANGIO HRT W/3D IMAGE: CPT | Mod: 26,MH

## 2022-09-28 ENCOUNTER — OUTPATIENT (OUTPATIENT)
Dept: OUTPATIENT SERVICES | Facility: HOSPITAL | Age: 77
LOS: 1 days | Discharge: HOME | End: 2022-09-28

## 2022-09-28 DIAGNOSIS — Z98.890 OTHER SPECIFIED POSTPROCEDURAL STATES: Chronic | ICD-10-CM

## 2022-09-28 DIAGNOSIS — R07.9 CHEST PAIN, UNSPECIFIED: ICD-10-CM

## 2022-09-28 DIAGNOSIS — Z98.891 HISTORY OF UTERINE SCAR FROM PREVIOUS SURGERY: Chronic | ICD-10-CM

## 2022-09-28 PROCEDURE — 0504T: CPT

## 2022-10-06 NOTE — ED ADULT NURSE NOTE - GENITOURINARY WDL
You can access the FollowMyHealth Patient Portal offered by [facility] by registering at the following website: [email or web address]. By joining [employer]’s FollowMyHealth portal, you will also be able to view your health information using other applications (apps) compatible with our system.
Bladder non-tender and non-distended. Urine clear yellow.

## 2022-10-26 ENCOUNTER — OUTPATIENT (OUTPATIENT)
Dept: OUTPATIENT SERVICES | Facility: HOSPITAL | Age: 77
LOS: 1 days | Discharge: HOME | End: 2022-10-26

## 2022-10-26 VITALS
TEMPERATURE: 97 F | SYSTOLIC BLOOD PRESSURE: 159 MMHG | OXYGEN SATURATION: 97 % | DIASTOLIC BLOOD PRESSURE: 80 MMHG | HEART RATE: 86 BPM | RESPIRATION RATE: 16 BRPM

## 2022-10-26 DIAGNOSIS — Z98.891 HISTORY OF UTERINE SCAR FROM PREVIOUS SURGERY: Chronic | ICD-10-CM

## 2022-10-26 DIAGNOSIS — Z98.890 OTHER SPECIFIED POSTPROCEDURAL STATES: Chronic | ICD-10-CM

## 2022-10-26 LAB
ANION GAP SERPL CALC-SCNC: 13 MMOL/L — SIGNIFICANT CHANGE UP (ref 7–14)
BUN SERPL-MCNC: 16 MG/DL — SIGNIFICANT CHANGE UP (ref 10–20)
CALCIUM SERPL-MCNC: 10.9 MG/DL — HIGH (ref 8.4–10.5)
CHLORIDE SERPL-SCNC: 101 MMOL/L — SIGNIFICANT CHANGE UP (ref 98–110)
CO2 SERPL-SCNC: 28 MMOL/L — SIGNIFICANT CHANGE UP (ref 17–32)
CREAT SERPL-MCNC: 0.8 MG/DL — SIGNIFICANT CHANGE UP (ref 0.7–1.5)
EGFR: 76 ML/MIN/1.73M2 — SIGNIFICANT CHANGE UP
GLUCOSE SERPL-MCNC: 130 MG/DL — HIGH (ref 70–99)
HCT VFR BLD CALC: 37 % — SIGNIFICANT CHANGE UP (ref 37–47)
HCT VFR BLD CALC: 44.4 % — SIGNIFICANT CHANGE UP (ref 37–47)
HGB BLD-MCNC: 12.3 G/DL — SIGNIFICANT CHANGE UP (ref 12–16)
HGB BLD-MCNC: 14.2 G/DL — SIGNIFICANT CHANGE UP (ref 12–16)
MCHC RBC-ENTMCNC: 27.3 PG — SIGNIFICANT CHANGE UP (ref 27–31)
MCHC RBC-ENTMCNC: 27.7 PG — SIGNIFICANT CHANGE UP (ref 27–31)
MCHC RBC-ENTMCNC: 32 G/DL — SIGNIFICANT CHANGE UP (ref 32–37)
MCHC RBC-ENTMCNC: 33.2 G/DL — SIGNIFICANT CHANGE UP (ref 32–37)
MCV RBC AUTO: 83.3 FL — SIGNIFICANT CHANGE UP (ref 81–99)
MCV RBC AUTO: 85.2 FL — SIGNIFICANT CHANGE UP (ref 81–99)
NRBC # BLD: 0 /100 WBCS — SIGNIFICANT CHANGE UP (ref 0–0)
NRBC # BLD: 0 /100 WBCS — SIGNIFICANT CHANGE UP (ref 0–0)
PLATELET # BLD AUTO: 161 K/UL — SIGNIFICANT CHANGE UP (ref 130–400)
PLATELET # BLD AUTO: 192 K/UL — SIGNIFICANT CHANGE UP (ref 130–400)
POTASSIUM SERPL-MCNC: 4.3 MMOL/L — SIGNIFICANT CHANGE UP (ref 3.5–5)
POTASSIUM SERPL-SCNC: 4.3 MMOL/L — SIGNIFICANT CHANGE UP (ref 3.5–5)
RBC # BLD: 4.44 M/UL — SIGNIFICANT CHANGE UP (ref 4.2–5.4)
RBC # BLD: 5.21 M/UL — SIGNIFICANT CHANGE UP (ref 4.2–5.4)
RBC # FLD: 13.5 % — SIGNIFICANT CHANGE UP (ref 11.5–14.5)
RBC # FLD: 13.5 % — SIGNIFICANT CHANGE UP (ref 11.5–14.5)
SODIUM SERPL-SCNC: 142 MMOL/L — SIGNIFICANT CHANGE UP (ref 135–146)
WBC # BLD: 5.79 K/UL — SIGNIFICANT CHANGE UP (ref 4.8–10.8)
WBC # BLD: 6.11 K/UL — SIGNIFICANT CHANGE UP (ref 4.8–10.8)
WBC # FLD AUTO: 5.79 K/UL — SIGNIFICANT CHANGE UP (ref 4.8–10.8)
WBC # FLD AUTO: 6.11 K/UL — SIGNIFICANT CHANGE UP (ref 4.8–10.8)

## 2022-10-26 PROCEDURE — 93010 ELECTROCARDIOGRAM REPORT: CPT

## 2022-10-26 RX ORDER — PITAVASTATIN CALCIUM 1.04 MG/1
1 TABLET, FILM COATED ORAL
Qty: 0 | Refills: 0 | DISCHARGE

## 2022-10-26 RX ORDER — OMEPRAZOLE 10 MG/1
1 CAPSULE, DELAYED RELEASE ORAL
Qty: 0 | Refills: 0 | DISCHARGE

## 2022-10-26 RX ORDER — ASPIRIN/CALCIUM CARB/MAGNESIUM 324 MG
1 TABLET ORAL
Qty: 0 | Refills: 0 | DISCHARGE

## 2022-10-26 RX ORDER — MONTELUKAST 4 MG/1
0 TABLET, CHEWABLE ORAL
Qty: 0 | Refills: 0 | DISCHARGE

## 2022-10-26 RX ORDER — CLOPIDOGREL BISULFATE 75 MG/1
1 TABLET, FILM COATED ORAL
Qty: 30 | Refills: 3
Start: 2022-10-26 | End: 2023-02-22

## 2022-10-26 RX ORDER — MESALAMINE 400 MG
0 TABLET, DELAYED RELEASE (ENTERIC COATED) ORAL
Qty: 0 | Refills: 0 | DISCHARGE

## 2022-10-26 RX ORDER — EZETIMIBE 10 MG/1
1 TABLET ORAL
Qty: 0 | Refills: 0 | DISCHARGE

## 2022-10-26 RX ORDER — PANTOPRAZOLE SODIUM 20 MG/1
1 TABLET, DELAYED RELEASE ORAL
Qty: 0 | Refills: 0 | DISCHARGE

## 2022-10-26 RX ORDER — MONTELUKAST 4 MG/1
1 TABLET, CHEWABLE ORAL
Qty: 0 | Refills: 0 | DISCHARGE

## 2022-10-26 RX ORDER — ESCITALOPRAM OXALATE 10 MG/1
1 TABLET, FILM COATED ORAL
Qty: 0 | Refills: 0 | DISCHARGE

## 2022-10-26 RX ORDER — LOSARTAN/HYDROCHLOROTHIAZIDE 100MG-25MG
1 TABLET ORAL
Qty: 0 | Refills: 0 | DISCHARGE

## 2022-10-26 RX ORDER — DILTIAZEM HCL 120 MG
1 CAPSULE, EXT RELEASE 24 HR ORAL
Qty: 0 | Refills: 0 | DISCHARGE

## 2022-10-26 RX ORDER — LORATADINE 10 MG/1
0 TABLET ORAL
Qty: 0 | Refills: 0 | DISCHARGE

## 2022-10-26 RX ORDER — LORATADINE 10 MG/1
1 TABLET ORAL
Qty: 0 | Refills: 0 | DISCHARGE

## 2022-10-26 NOTE — PROGRESS NOTE ADULT - SUBJECTIVE AND OBJECTIVE BOX
Cardiology Follow up s/p PCI TAINA DUVALL   77y Female  PAST MEDICAL & SURGICAL HISTORY:  Hypertension      GERD (gastroesophageal reflux disease)      Anxiety      Hemorrhoid      IBS (irritable bowel syndrome)      Seasonal allergies      HLD (hyperlipidemia)      Left bundle branch block (LBBB)      Nodule of right lung      Aneurysm, splenic artery      H/O breast biopsy  LEFT      H/O  section  X2      HPI:  Patient is a 77y Female who presents to the cardiology department for LHC.     Past Medical History:    anxiety  asthma  HTN  IBS  Hiatal hernia  Osteoporosis    Past Surgical History:  breast biopsy  c- section    Allergies    codeine (Vomiting)  penicillin (Hives; Joint Pain)    B/P 124/67  HR 59-68  RR 12  POx 98% on RA      REVIEW OF SYSTEMS:          All negative except as mentioned in HPI    PHYSICAL EXAM:           CONSTITUTIONAL: Well-developed; well-nourished; in no acute distress  	SKIN: warm, dry  	HEAD: Normocephalic; atraumatic  	EYES: PERRL.  	ENT: No nasal discharge, airway clear, mucous membranes moist  	NECK: Supple; non tender.  	CARD: +S1, +S2, no murmurs, gallops, or rubs. SB/Regular rate and rhythm    	RESP: No wheezes, rales or rhonchi. CTA B/L  	ABD: soft ntnd, + BS x 4 quadrants  	EXT: moves all extremities,  no clubbing, cyanosis or edema  	NEURO: Alert and oriented x3, no focal deficits          PSYCH: Cooperative, appropriate          VASCULAR:  + Rad / + PTs / +  DPs          EXTREMITY:             Right Radial: Dressing D/C/I, access site soft, no hematoma, no pain, + pulses, no sign of infection, no numbness            ECG:   P    CBC:  P                                                                                                                LABS:                        14.2   5.79  )-----------( 192      ( 26 Oct 2022 10:32 )             44.4     10-26    142  |  101  |  16  ----------------------------<  130<H>  4.3   |  28  |  0.8    Ca    10.9<H>      26 Oct 2022 10:32      A/P:  I discussed the case with Cardiologist Dr. Avendano and recommend the following:    S/P PCI:   6Fr right Radial Artery -> TR band     IV Contrast:  125      mL      Intervention: PCI to mid LAD (AUC 7 for revascularization)    Implants:   Xience Skypoint 2.75 x 23     FINDINGS:     Coronary Dominance: Right    LM: No disease   LAD: Mild disease with 80% lesion of the mid LAD.   Diag1: No disease   LCx: Mild disease.   OM1: Mild disease   OM2: Mild disease    RCA: Moderate disease. 50% lesion of the mid RCA.   rPDA: Mild disease     LVEDP:   15                      CBC/ECG at 7 pm                   NS 100cc/hr x 6hr                   No B-Blocker due to Asthma and bradycardiac s/p Cardiac Cath   	     Continue DAPT ( Aspirin 81 mg daily and Plavix 75 mg daily ), ARB-HCTZ, CCB, Statin Therapy                   Patient given 30 day supply of ( Aspirin 81 mg daily and Plavix 75 mg daily ) to take at home                   Patient agreeing to take DAPT for at least one year or as directed by cardiologist                    Pt given instructions on importance of taking antiplatelet medication or risk acute stent thrombosis/death                   Post cath instructions, access site care and activity restrictions reviewed with patient                     Discussed with patient to return to hospital if experience chest pain, shortness breath, dizziness and site bleeding                   Aggressive risk factor modification, diet counseling, smoking cessation discussed with patient                       Can discharge patient from cardiac standpoint at 9 pm after ambulating without symptoms and access site wnl, ECG and blood work reviewed                    Benefits of Cardiac Rehab discussed with patient, All documents sent to [facility]. Patient instructed to call and make first                               appointment after first f/u visit with Cardiologist                    Follow up with Cardiology Dr. Chowdhury in two weeks. Instructed to call and make an appointment

## 2022-10-26 NOTE — CHART NOTE - NSCHARTNOTEFT_GEN_A_CORE
PRE-OP DIAGNOSIS:        PROCEDURE:   [x] Coronary Angiogram   [x] LHC   [] RHC   [] Intervention (see below)        PHYSICIAN:   INTERVENTIONAL FELLOW:    CARDIOLOGY FELLOW: Dr. Delvalle      PROCEDURE DESCRIPTION:     Consent:  [x] Patient   [] Family Member   []  Used     Anesthesia:   [] General   [x] Sedation   [x] Local     Access & Closure:   [x] 6Fr right Radial Artery   [] 6Fr right Femoral Artery   [] 6Fr right Femoral Vein   [] 6Fr right Brachial Vein       IV Contrast:  125      mL        Intervention: PCI to mid LAD (AUC 7 for revascularization)    Implants:   Xience Skypoint 2.75 x 23     FINDINGS:     Coronary Dominance: Right    LM: No disease   LAD: Mild disease with 80% lesion of the mid LAD.   Diag1: No disease   LCx: Mild disease.   OM1: Mild disease   OM2: Mild disease    RCA: Moderate disease. 50% lesion of the mid RCA.   rPDA:     LVEDP:   15       ESTIMATED BLOOD LOSS: < 10 mL      CONDITION:   [x] Good     SPECIMEN REMOVED: N/A     POST-OP DIAGNOSIS:    [X] Significant 1-Vessel Coronary Artery Disease of the mid LAD s/p balloon angioplasty and FREDIS placement.     PLAN OF CARE:   [X] D/C Home Today   [X] Medications: ASA, Plavix, statin, beta blocker, losartan   [X] IV Fluids: 100CC PER HOUR for 6 hours PRE-OP DIAGNOSIS:    Angina   Positive CT FFR    PROCEDURE:   [x] Coronary Angiogram   [x] [facility]   [X] Intervention (see below)        PHYSICIAN: Dr. Avendano  CARDIOLOGY FELLOW: Dr. Ibrahim, Dr. Delvalle    PROCEDURE DESCRIPTION:   [facility]  Bilateral coronary angiography   Balloon angioplasty   FREDIS placement     Consent:  [x] Patient     Anesthesia:   [x] Sedation   [x] Local     Access & Closure:   [x] 6Fr right Radial Artery -> TR band     IV Contrast:  125      mL      Intervention: PCI to mid LAD (AUC 7 for revascularization)    Implants:   Xience Skypoint 2.75 x 23     FINDINGS:     Coronary Dominance: Right    LM: No disease   LAD: Mild disease with 80% lesion of the mid LAD.   Diag1: No disease   LCx: Mild disease.   OM1: Mild disease   OM2: Mild disease    RCA: Moderate disease. 50% lesion of the mid RCA.   rPDA:     LVEDP:   15       ESTIMATED BLOOD LOSS: < 10 mL      CONDITION:   [x] Good     SPECIMEN REMOVED: N/A     POST-OP DIAGNOSIS:    [X] Significant 1-Vessel Coronary Artery Disease of the mid LAD s/p balloon angioplasty and FREDIS placement.     PLAN OF CARE:   [X] D/C Home Today   [X] Medications: ASA, Plavix, statin, beta blocker, losartan   [X] IV Fluids: 100CC PER HOUR for 6 hours PRE-OP DIAGNOSIS:    Angina   Positive CT FFR  Borderline hemodynamically significant proximal LAD lesion.  CT FFR distalto a mid segmentlesion is 0.74, which may be due to   hemodynamically significance versus high plaque burden.    PROCEDURE:   [x] Coronary Angiogram   [x] [facility]   [X] Intervention (see below)        PHYSICIAN: Dr. Avendano  [facility]    PROCEDURE DESCRIPTION:   [facility]  Bilateral coronary angiography   Balloon angioplasty   FREDIS placement     Consent:  [x] Patient     Anesthesia:   [x] Sedation   [x] Local     Access & Closure:   [x] 6Fr right Radial Artery -> TR band     IV Contrast:  125      mL      Intervention: PCI to mid LAD (AUC 7 for revascularization)    Implants:   Xience Skypoint 2.75 x 23     FINDINGS:     Coronary Dominance: Right    LM: No disease   LAD: Mild disease with 80% lesion of the mid LAD.   Diag1: No disease   LCx: Mild disease.   OM1: Mild disease   OM2: Mild disease    RCA: Moderate disease. 50% lesion of the mid RCA.   rPDA:     LVEDP:   15       ESTIMATED BLOOD LOSS: < 10 mL      CONDITION:   [x] Good     SPECIMEN REMOVED: N/A     POST-OP DIAGNOSIS:    [X] Significant 1-Vessel Coronary Artery Disease of the mid LAD s/p balloon angioplasty and FREDIS placement.     PLAN OF CARE:   [X] D/C Home Today   [X] Medications: ASA, Plavix, statin, beta blocker, losartan   [X] IV Fluids: 100CC PER HOUR for 6 hours PRE-OP DIAGNOSIS:    Angina   Positive CT FFR  Borderline hemodynamically significant proximal LAD lesion.  CT FFR distalto a mid segmentlesion is 0.74, which may be due to   hemodynamically significance versus high plaque burden.    PROCEDURE:   [x] Coronary Angiogram   [x] [facility]   [X] Intervention (see below)        PHYSICIAN: Dr. Avendano  [facility]    PROCEDURE DESCRIPTION:   [facility]  Bilateral coronary angiography   Balloon angioplasty   FREDIS placement     Consent:  [x] Patient     Anesthesia:   [x] Sedation   [x] Local     Access & Closure:   [x] 6Fr right Radial Artery -> TR band     IV Contrast:  125      mL      Intervention: PCI to mid LAD (AUC 7 for revascularization)    Implants:   Xience Skypoint 2.75 x 23     FINDINGS:     Coronary Dominance: Right    LM: No disease   LAD: Mild disease with 80% lesion of the mid LAD.   Diag1: No disease   LCx: Mild disease.   OM1: Mild disease   OM2: Mild disease    RCA: Moderate disease. 50% lesion of the mid RCA.   rPDA: Mild disease     LVEDP:   15       ESTIMATED BLOOD LOSS: < 10 mL      CONDITION:   [x] Good     SPECIMEN REMOVED: N/A     POST-OP DIAGNOSIS:    [X] Significant 1-Vessel Coronary Artery Disease of the mid LAD s/p balloon angioplasty and FREDIS placement.     PLAN OF CARE:   [X] D/C Home Today   [X] Medications: ASA, Plavix, statin, beta blocker, losartan   [X] IV Fluids: 100CC PER HOUR for 6 hours

## 2022-10-26 NOTE — H&P CARDIOLOGY - HISTORY OF PRESENT ILLNESS
Patient is a 77y Female who presents to the cardiology department for C.       Pre cath note:    indication:  [ ] STEMI                [ ] NSTEMI                 [ ] Acute coronary syndrome                     [ ]Unstable Angina   [ ] high risk  [ ] intermediate risk  [ ] low risk                     [x ] Stable Angina     non-invasive testing:                          Date:        CCTA 9/28/22             result: [ ] high risk  [x ] intermediate risk  [ ] low risk    Anti- Anginal medications:                    [ ] not used                       [ x] used                   [ ] not used but strong indication not to use    Ejection Fraction                   [ ] <29            [ ] 30-39%   [ x] 40-49%     [ ]>50%    CHF                   [ ] active (within last 14 days on meds   [ ] Chronic (on meds but no exacerbation)    COPD                   [ ] mild (on chronic bronchodilators)  [ ] moderate (on chronic steroid therapy)      [ ] severe (indication for home O2 or PACO2 >50)    Other risk factors:                       [ ] Previous MI                     [ ] CVA/ stroke                    [ ] carotid stent/ CEA                    [ ] PVD/PAD- (arterial aneurysm, non-palpable pulses, tortuous vessel with inability to insert catheter, infra-renal dissection, renal or subclavian artery stenosis)                    [ ] diabetic                    [ ] previous CABG                    [ ] Renal Failure      cc/hr x 1 hour prior to cath.  EF 45-50% 2018  Adjusted Bleeding Score: 1.7%    SUBJ:  78 y/o female presents for [facility]. Pt c/o  "heart burn " intermittently for last few months.   CT Fractional Flow Reserve (FFR-CT) Data Prep Transmission And Analysis Interpretation (09.28.22 @ 14:19)   ACC: [id number] EXAM:  FFR CT DATA PREP AND ANALYSIS#                          PROCEDURE DATE:  09/28/2022          INTERPRETATION:  FFR-CT CORONARY ANALYSIS    CLINICAL INDICATION:Chest pain with abnormal coronary CCTA requiring   functional analysis.  Pertinent Coronary CTA findings: Atherosclerotic disease within the   coronary arteries contributing up to moderate stenosis within the   proximal LAD    METHODS:    CT data images prepared and securely transmitted to [facility]FFRCT   analysis was performed on the original cardiac CT angiogram dataset.   Diagrammatic representation of the FFRCT analysis is returned and   provided in a separate PDF document in PACS. This dictation was created   using the PDF document and an interactive 3D model of the results. 3D   model is not available in the EMR/PACS.    FINDINGS:    Left Main: End vessel CT-FFR value of 0.97. No evidence of a   hemodynamically significant focal lesion.    LAD: CT FFR distal to the proximal lesion is 0.79, which is borderline   hemodynamically significant. CT FFR value distal to a mid segment lesion   is 0.74, which may be due to a hemodynamically significant lesion versus   high plaque burden.    LCX: Progressive pressure loss with a distal vessel value of 0.78 likely   due to poor vascular health.    RCA: End vessel CT-FFR value of 0.85. No evidence of a hemodynamically   significant focal lesion.      IMPRESSION:    Borderline hemodynamically significant proximal LAD lesion.    CT FFR distal to a mid segmentlesion is 0.74, which may be due to   hemodynamically significance versus high plaque burden.      Reference FFRCT Values:    a. >0.80 (distal to the stenosis): Not hemodynamically significant    b. >0.76-0.8 (distal to the stenosis): Borderline hemodynamically   significant    c. ?0.75 (distal to the stenosis): Hemodynamically significant    --- End of Report ---        Past Medical History:    anxiety  asthma  HTN  IBS  Hiatal hernia  Osteoperosis    Past Surgical History:  breast biopsy  c- section      REVIEW OF SYSTEMS:  CONSTITUTIONAL: No fever, weight loss, or fatigue  CARDIOLOGY: Patient denies chest pain, shortness of breath or syncopal episodes.   RESPIRATORY: denies shortness of breath, wheezing.   NEUROLOGICAL: NO weakness, no focal deficits to report.  GI: no BRBPR, no N,V,diarrhea.     PHYSICAL EXAM:  · CONSTITUTIONAL:	Well-developed, well nourished     · RESPIRATORY:   airway patent; breath sounds equal; good air movement; respirations non-labored; clear to auscultation bilaterally; no chest wall tenderness; no intercostal retractions; no rales,rhonchi or wheeze  · CARDIOVASCULAR	regular rate and rhythm  no rub  no murmur    · EXTREMITIES: No cyanosis, clubbing or edema  · VASCULAR: 	Equal and normal pulses (carotid, femoral, dorsalis pedis)  	  Augustine Test WNL       risk factors

## 2022-10-26 NOTE — ASU PATIENT PROFILE, ADULT - NSICDXPASTMEDICALHX_GEN_ALL_CORE_FT
PAST MEDICAL HISTORY:  Aneurysm, splenic artery     Anxiety     GERD (gastroesophageal reflux disease)     Hemorrhoid     HLD (hyperlipidemia)     Hypertension     IBS (irritable bowel syndrome)     Left bundle branch block (LBBB)     Nodule of right lung     Seasonal allergies

## 2022-10-31 DIAGNOSIS — I25.118 ATHEROSCLEROTIC HEART DISEASE OF NATIVE CORONARY ARTERY WITH OTHER FORMS OF ANGINA PECTORIS: ICD-10-CM

## 2022-10-31 DIAGNOSIS — E11.9 TYPE 2 DIABETES MELLITUS WITHOUT COMPLICATIONS: ICD-10-CM

## 2022-10-31 DIAGNOSIS — I20.8 OTHER FORMS OF ANGINA PECTORIS: ICD-10-CM

## 2022-10-31 DIAGNOSIS — E78.00 PURE HYPERCHOLESTEROLEMIA, UNSPECIFIED: ICD-10-CM

## 2022-10-31 DIAGNOSIS — Z79.84 LONG TERM (CURRENT) USE OF ORAL HYPOGLYCEMIC DRUGS: ICD-10-CM

## 2022-10-31 DIAGNOSIS — I10 ESSENTIAL (PRIMARY) HYPERTENSION: ICD-10-CM

## 2022-10-31 DIAGNOSIS — Z88.0 ALLERGY STATUS TO PENICILLIN: ICD-10-CM

## 2022-11-01 PROBLEM — R91.1 SOLITARY PULMONARY NODULE: Chronic | Status: ACTIVE | Noted: 2022-10-26

## 2022-11-01 PROBLEM — E78.5 HYPERLIPIDEMIA, UNSPECIFIED: Chronic | Status: ACTIVE | Noted: 2022-10-26

## 2022-11-01 PROBLEM — I72.8 ANEURYSM OF OTHER SPECIFIED ARTERIES: Chronic | Status: ACTIVE | Noted: 2022-10-26

## 2022-11-01 PROBLEM — I44.7 LEFT BUNDLE-BRANCH BLOCK, UNSPECIFIED: Chronic | Status: ACTIVE | Noted: 2022-10-26

## 2022-11-04 ENCOUNTER — APPOINTMENT (OUTPATIENT)
Dept: VASCULAR SURGERY | Facility: CLINIC | Age: 77
End: 2022-11-04

## 2022-11-04 DIAGNOSIS — M79.89 OTHER SPECIFIED SOFT TISSUE DISORDERS: ICD-10-CM

## 2022-11-04 PROCEDURE — 93970 EXTREMITY STUDY: CPT

## 2022-12-12 NOTE — PRE-ANESTHESIA EVALUATION ADULT - NSANTHDISPORD_GEN_ALL_CORE
PACU Tetracycline Counseling: Patient counseled regarding possible photosensitivity and increased risk for sunburn.  Patient instructed to avoid sunlight, if possible.  When exposed to sunlight, patients should wear protective clothing, sunglasses, and sunscreen.  The patient was instructed to call the office immediately if the following severe adverse effects occur:  hearing changes, easy bruising/bleeding, severe headache, or vision changes.  The patient verbalized understanding of the proper use and possible adverse effects of tetracycline.  All of the patient's questions and concerns were addressed. Patient understands to avoid pregnancy while on therapy due to potential birth defects.

## 2023-03-04 ENCOUNTER — OUTPATIENT (OUTPATIENT)
Dept: OUTPATIENT SERVICES | Facility: HOSPITAL | Age: 78
LOS: 1 days | End: 2023-03-04
Payer: MEDICARE

## 2023-03-04 DIAGNOSIS — Z98.890 OTHER SPECIFIED POSTPROCEDURAL STATES: Chronic | ICD-10-CM

## 2023-03-04 DIAGNOSIS — Z12.31 ENCOUNTER FOR SCREENING MAMMOGRAM FOR MALIGNANT NEOPLASM OF BREAST: ICD-10-CM

## 2023-03-04 DIAGNOSIS — Z98.891 HISTORY OF UTERINE SCAR FROM PREVIOUS SURGERY: Chronic | ICD-10-CM

## 2023-03-04 DIAGNOSIS — Z00.8 ENCOUNTER FOR OTHER GENERAL EXAMINATION: ICD-10-CM

## 2023-03-04 PROCEDURE — 77063 BREAST TOMOSYNTHESIS BI: CPT | Mod: 26

## 2023-03-04 PROCEDURE — 77063 BREAST TOMOSYNTHESIS BI: CPT

## 2023-03-04 PROCEDURE — 77067 SCR MAMMO BI INCL CAD: CPT

## 2023-03-04 PROCEDURE — 77067 SCR MAMMO BI INCL CAD: CPT | Mod: 26

## 2023-05-09 NOTE — ASU PATIENT PROFILE, ADULT - PMH
reports pain. States that she was told to meet MD Duffy here. Anxiety    GERD (gastroesophageal reflux disease)    Hemorrhoid    Hypertension    IBS (irritable bowel syndrome)    Seasonal allergies

## 2023-09-15 ENCOUNTER — OUTPATIENT (OUTPATIENT)
Dept: OUTPATIENT SERVICES | Facility: HOSPITAL | Age: 78
LOS: 1 days | End: 2023-09-15
Payer: MEDICARE

## 2023-09-15 DIAGNOSIS — R10.9 UNSPECIFIED ABDOMINAL PAIN: ICD-10-CM

## 2023-09-15 DIAGNOSIS — Z00.8 ENCOUNTER FOR OTHER GENERAL EXAMINATION: ICD-10-CM

## 2023-09-15 DIAGNOSIS — Z98.890 OTHER SPECIFIED POSTPROCEDURAL STATES: Chronic | ICD-10-CM

## 2023-09-15 DIAGNOSIS — Z98.891 HISTORY OF UTERINE SCAR FROM PREVIOUS SURGERY: Chronic | ICD-10-CM

## 2023-09-15 PROCEDURE — 76705 ECHO EXAM OF ABDOMEN: CPT | Mod: 26

## 2023-09-15 PROCEDURE — 76705 ECHO EXAM OF ABDOMEN: CPT

## 2023-09-16 DIAGNOSIS — R10.9 UNSPECIFIED ABDOMINAL PAIN: ICD-10-CM

## 2024-06-28 ENCOUNTER — OUTPATIENT (OUTPATIENT)
Dept: OUTPATIENT SERVICES | Facility: HOSPITAL | Age: 79
LOS: 1 days | End: 2024-06-28
Payer: MEDICARE

## 2024-06-28 DIAGNOSIS — Z98.891 HISTORY OF UTERINE SCAR FROM PREVIOUS SURGERY: Chronic | ICD-10-CM

## 2024-06-28 DIAGNOSIS — Z12.31 ENCOUNTER FOR SCREENING MAMMOGRAM FOR MALIGNANT NEOPLASM OF BREAST: ICD-10-CM

## 2024-06-28 DIAGNOSIS — Z00.00 ENCOUNTER FOR GENERAL ADULT MEDICAL EXAMINATION WITHOUT ABNORMAL FINDINGS: ICD-10-CM

## 2024-06-28 DIAGNOSIS — Z13.820 ENCOUNTER FOR SCREENING FOR OSTEOPOROSIS: ICD-10-CM

## 2024-06-28 DIAGNOSIS — Z98.890 OTHER SPECIFIED POSTPROCEDURAL STATES: Chronic | ICD-10-CM

## 2024-06-28 PROCEDURE — 77080 DXA BONE DENSITY AXIAL: CPT | Mod: 26

## 2024-06-28 PROCEDURE — 77067 SCR MAMMO BI INCL CAD: CPT

## 2024-06-28 PROCEDURE — 77067 SCR MAMMO BI INCL CAD: CPT | Mod: 26

## 2024-06-28 PROCEDURE — 77080 DXA BONE DENSITY AXIAL: CPT

## 2024-06-28 PROCEDURE — 77063 BREAST TOMOSYNTHESIS BI: CPT | Mod: 26

## 2024-06-28 PROCEDURE — 77063 BREAST TOMOSYNTHESIS BI: CPT

## 2024-06-29 DIAGNOSIS — Z12.31 ENCOUNTER FOR SCREENING MAMMOGRAM FOR MALIGNANT NEOPLASM OF BREAST: ICD-10-CM

## 2024-06-29 DIAGNOSIS — Z13.820 ENCOUNTER FOR SCREENING FOR OSTEOPOROSIS: ICD-10-CM

## 2024-07-02 DIAGNOSIS — M81.0 AGE-RELATED OSTEOPOROSIS WITHOUT CURRENT PATHOLOGICAL FRACTURE: ICD-10-CM

## 2024-08-07 ENCOUNTER — OUTPATIENT (OUTPATIENT)
Dept: OUTPATIENT SERVICES | Facility: HOSPITAL | Age: 79
LOS: 1 days | End: 2024-08-07
Payer: MEDICARE

## 2024-08-07 DIAGNOSIS — Z98.891 HISTORY OF UTERINE SCAR FROM PREVIOUS SURGERY: Chronic | ICD-10-CM

## 2024-08-07 DIAGNOSIS — Z98.890 OTHER SPECIFIED POSTPROCEDURAL STATES: Chronic | ICD-10-CM

## 2024-08-07 DIAGNOSIS — M25.522 PAIN IN LEFT ELBOW: ICD-10-CM

## 2024-08-07 PROCEDURE — 73562 X-RAY EXAM OF KNEE 3: CPT | Mod: 50

## 2024-08-07 PROCEDURE — 73522 X-RAY EXAM HIPS BI 3-4 VIEWS: CPT | Mod: 26

## 2024-08-07 PROCEDURE — 73562 X-RAY EXAM OF KNEE 3: CPT | Mod: 26,50

## 2024-08-07 PROCEDURE — 73522 X-RAY EXAM HIPS BI 3-4 VIEWS: CPT

## 2024-08-07 PROCEDURE — 73110 X-RAY EXAM OF WRIST: CPT | Mod: 50

## 2024-08-07 PROCEDURE — 73110 X-RAY EXAM OF WRIST: CPT | Mod: 26,50

## 2024-08-07 PROCEDURE — 73080 X-RAY EXAM OF ELBOW: CPT | Mod: 26,LT

## 2024-08-07 PROCEDURE — 73080 X-RAY EXAM OF ELBOW: CPT | Mod: LT

## 2024-08-08 DIAGNOSIS — M25.522 PAIN IN LEFT ELBOW: ICD-10-CM

## 2024-08-23 ENCOUNTER — APPOINTMENT (OUTPATIENT)
Dept: PAIN MANAGEMENT | Facility: CLINIC | Age: 79
End: 2024-08-23

## 2024-08-23 DIAGNOSIS — M25.531 PAIN IN RIGHT WRIST: ICD-10-CM

## 2024-08-23 DIAGNOSIS — M46.1 SACROILIITIS, NOT ELSEWHERE CLASSIFIED: ICD-10-CM

## 2024-08-23 PROCEDURE — 99204 OFFICE O/P NEW MOD 45 MIN: CPT | Mod: 25

## 2024-08-23 PROCEDURE — 20550 NJX 1 TENDON SHEATH/LIGAMENT: CPT | Mod: RT

## 2024-08-23 PROCEDURE — J3490M: CUSTOM | Mod: NC

## 2024-08-23 NOTE — DISCUSSION/SUMMARY
[de-identified] : A discussion regarding available pain management treatment options occurred with the patient. These included interventional, rehabilitative, pharmacological, and alternative modalities. We will proceed with the following:   Interventional treatment options: -Right wrist steroid injection done today in the office, verbal consent was obtained  Risks with the procedure such as bleeding and infection was discussed in detail.  - Left SI joint injection SI joint injection: - Patient has pain on the PSIS area (+) Brock's sign, and positive Marlon's (José Luis) test. The patient has trailed rehab (Home exercise, physical therapy or chiropractic care) and medications without improvement in their pain. - We will schedule a right diagnostic and therapeutic sacroiliac joint injection. If there is 100% relief immediately, or 70% relief greater than 2 hours, or at least >50% for 24 hrs, would do a second right sacroiliac joint injection in 1-2 weeks. In case of greater than 50% relief for 6-8 weeks post SI joint injection, a right sacroiliac joint injection could be repeated in 3 months vs RFA or referral to neurosurgeon.   BALDO Vega DO

## 2024-08-23 NOTE — PHYSICAL EXAM
[de-identified] : (+) Finkelstein test right side  (+) Brock's sign: Pt has tenderness when applying direct pressure over the left SI joint (+) José Luis test, compression test, gaenslens: reproduction of pain into the groin on the affected side.  left side

## 2024-08-23 NOTE — PROCEDURE
[Right] : of the right [Pain] : pain [X-ray evidence of Osteoarthritis on this or prior visit] : x-ray evidence of Osteoarthritis on this or prior visit [Alcohol] : alcohol [Ethyl Chloride sprayed topically] : ethyl chloride sprayed topically [___ cc    0.5%] : Bupivacaine (Marcaine) ~Vcc of 0.5%  [____] : [unfilled] [Apply ice for 15min out of every hour for the next 12-24 hours as tolerated] : apply ice for 15 minutes out of every hour for the next 12-24 hours as tolerated [Previous OTC use and PT nontherapeutic] : patient has tried OTC's including aspirin, Ibuprofen, Aleve, etc or prescription NSAIDS, and/or exercises at home and/or physical therapy without satisfactory response [Risks, benefits, alternatives discussed / Verbal consent obtained] : the risks benefits, and alternatives have been discussed, and verbal consent was obtained [Left] : of the left [De Genet's] : jesus verdugo's [de-identified] : tendon sheath on the right wrist

## 2024-08-23 NOTE — HISTORY OF PRESENT ILLNESS
[FreeTextEntry1] : HISTORY OF PRESENT ILLNESS: Mrs. Mora is a 79-year-old female complaining of right wrist pain. The pain started after no specific injury or event.  The patient has had this pain for the past 2 months, Patient describes the pain as moderate to severe. She has trailed NSAIDs in the past with no relief, she is unable to be on NSAIDs for a long period of time due to her anticoagulant regimen (Eliquis for A-fib).  Admits to having some numbness and tingling, pain gets worse when she is doing house chores.  Patient states that she has osteoporosis she is being managed with Prolia Q 6 months by her primary care. Patient has a wrist x-ray done which showed severe osteoarthritic changes at the first metacarpocarpl with subchondral cystic changes.  We discussed doing a steroid injection today in office she agreed to do so  Patient also complains of left buttock pain that it usually gets worse when sitting down pain radiates to the anterior thigh and sometimes to her groin, pain usually gets better when she gets up and walks around.  Patient finds herself having to shift from 1 side to the other while sitting down to relieve her pain.  On exam patient is tender when palpating the SI joint region (positive for finger test)

## 2024-08-23 NOTE — DISCUSSION/SUMMARY
[de-identified] : A discussion regarding available pain management treatment options occurred with the patient. These included interventional, rehabilitative, pharmacological, and alternative modalities. We will proceed with the following:   Interventional treatment options: -Right wrist steroid injection done today in the office, verbal consent was obtained  Risks with the procedure such as bleeding and infection was discussed in detail.  - Left SI joint injection SI joint injection: - Patient has pain on the PSIS area (+) Brock's sign, and positive Marlon's (José Luis) test. The patient has trailed rehab (Home exercise, physical therapy or chiropractic care) and medications without improvement in their pain. - We will schedule a right diagnostic and therapeutic sacroiliac joint injection. If there is 100% relief immediately, or 70% relief greater than 2 hours, or at least >50% for 24 hrs, would do a second right sacroiliac joint injection in 1-2 weeks. In case of greater than 50% relief for 6-8 weeks post SI joint injection, a right sacroiliac joint injection could be repeated in 3 months vs RFA or referral to neurosurgeon.   BALDO Vega DO

## 2024-08-23 NOTE — PHYSICAL EXAM
[de-identified] : (+) Finkelstein test right side  (+) Brock's sign: Pt has tenderness when applying direct pressure over the left SI joint (+) José Luis test, compression test, gaenslens: reproduction of pain into the groin on the affected side.  left side

## 2024-08-23 NOTE — PROCEDURE
[Right] : of the right [Pain] : pain [X-ray evidence of Osteoarthritis on this or prior visit] : x-ray evidence of Osteoarthritis on this or prior visit [Alcohol] : alcohol [Ethyl Chloride sprayed topically] : ethyl chloride sprayed topically [___ cc    0.5%] : Bupivacaine (Marcaine) ~Vcc of 0.5%  [____] : [unfilled] [Apply ice for 15min out of every hour for the next 12-24 hours as tolerated] : apply ice for 15 minutes out of every hour for the next 12-24 hours as tolerated [Previous OTC use and PT nontherapeutic] : patient has tried OTC's including aspirin, Ibuprofen, Aleve, etc or prescription NSAIDS, and/or exercises at home and/or physical therapy without satisfactory response [Risks, benefits, alternatives discussed / Verbal consent obtained] : the risks benefits, and alternatives have been discussed, and verbal consent was obtained [Left] : of the left [De Genet's] : jesus verdugo's [de-identified] : tendon sheath on the right wrist

## 2024-09-26 ENCOUNTER — APPOINTMENT (OUTPATIENT)
Dept: PAIN MANAGEMENT | Facility: CLINIC | Age: 79
End: 2024-09-26

## 2024-10-14 ENCOUNTER — APPOINTMENT (OUTPATIENT)
Dept: PAIN MANAGEMENT | Facility: CLINIC | Age: 79
End: 2024-10-14
Payer: MEDICARE

## 2024-10-14 DIAGNOSIS — M46.1 SACROILIITIS, NOT ELSEWHERE CLASSIFIED: ICD-10-CM

## 2024-10-14 DIAGNOSIS — M25.531 PAIN IN RIGHT WRIST: ICD-10-CM

## 2024-10-14 PROCEDURE — 99213 OFFICE O/P EST LOW 20 MIN: CPT

## 2024-11-11 ENCOUNTER — APPOINTMENT (OUTPATIENT)
Dept: ORTHOPEDIC SURGERY | Facility: CLINIC | Age: 79
End: 2024-11-11
Payer: MEDICARE

## 2024-11-11 DIAGNOSIS — M65.4 RADIAL STYLOID TENOSYNOVITIS [DE QUERVAIN]: ICD-10-CM

## 2024-11-11 PROCEDURE — 99202 OFFICE O/P NEW SF 15 MIN: CPT

## 2025-09-04 ENCOUNTER — OUTPATIENT (OUTPATIENT)
Dept: OUTPATIENT SERVICES | Facility: HOSPITAL | Age: 80
LOS: 1 days | End: 2025-09-04
Payer: MEDICARE

## 2025-09-04 DIAGNOSIS — Z12.31 ENCOUNTER FOR SCREENING MAMMOGRAM FOR MALIGNANT NEOPLASM OF BREAST: ICD-10-CM

## 2025-09-04 DIAGNOSIS — Z98.890 OTHER SPECIFIED POSTPROCEDURAL STATES: Chronic | ICD-10-CM

## 2025-09-04 DIAGNOSIS — Z98.891 HISTORY OF UTERINE SCAR FROM PREVIOUS SURGERY: Chronic | ICD-10-CM

## 2025-09-04 PROCEDURE — 77063 BREAST TOMOSYNTHESIS BI: CPT

## 2025-09-04 PROCEDURE — 77067 SCR MAMMO BI INCL CAD: CPT

## 2025-09-04 PROCEDURE — 77063 BREAST TOMOSYNTHESIS BI: CPT | Mod: 26

## 2025-09-04 PROCEDURE — 77067 SCR MAMMO BI INCL CAD: CPT | Mod: 26

## 2025-09-05 DIAGNOSIS — Z12.31 ENCOUNTER FOR SCREENING MAMMOGRAM FOR MALIGNANT NEOPLASM OF BREAST: ICD-10-CM
